# Patient Record
Sex: FEMALE | Race: BLACK OR AFRICAN AMERICAN | NOT HISPANIC OR LATINO | URBAN - METROPOLITAN AREA
[De-identification: names, ages, dates, MRNs, and addresses within clinical notes are randomized per-mention and may not be internally consistent; named-entity substitution may affect disease eponyms.]

---

## 2023-07-17 VITALS
SYSTOLIC BLOOD PRESSURE: 155 MMHG | RESPIRATION RATE: 16 BRPM | HEART RATE: 81 BPM | TEMPERATURE: 98 F | WEIGHT: 164.91 LBS | DIASTOLIC BLOOD PRESSURE: 75 MMHG | HEIGHT: 63 IN | OXYGEN SATURATION: 99 %

## 2023-07-17 RX ORDER — CHLORHEXIDINE GLUCONATE 213 G/1000ML
1 SOLUTION TOPICAL ONCE
Refills: 0 | Status: DISCONTINUED | OUTPATIENT
Start: 2023-07-20 | End: 2023-08-03

## 2023-07-17 NOTE — H&P ADULT - ASSESSMENT
63yo Female w/ PMHx of HTN, HLD, DM T2 who presented to outpatient cardiologist, Dr. Baltazar, endorsing mid sternal chest pressure that is independent of exertion and radiates to L sided jaw. Chest pressure episodes self resolve within a few minutes. Pt also endorsing LAZARO w/ only able to climb 2 flights of stairs before stopping to rest 3-5minutes. She reports having occasional dizziness with postural changes. Pt denies palpitations, syncope, LE edema, orthopnea, PND, abdominal pain, N/V, fever/chills, diaphoresis.      TTE (5/2/23): LVEF 58%, moderate cLVH, no RWMA, G I DD, mild TR.  NST (6/28/23): no evidence of ischemia at the achievable workload.     In light of patient's risk factors and persistent CCS Class IV anginal symptoms despite normal NST, patient now presents to [facility] for cardiac catheterization with possible intervention if clinically indicated.     - ASA III Malampati II  - VSS  - Patient is a candidate for moderate sedation  - Labs reviewed by PA   - EKG - NSR, TWi in III, prolonged   - LOAD -  mg (missed yesterday and today), Plavix 600 mg   - FLUIDS - NS 250ml bolus given x1; NS 75ml/hr given x 2hr     Risks & benefits of procedure and alternative therapy have been explained to the patient including but not limited to: allergic reaction, bleeding w/possible need for blood transfusion, infection, renal and vascular compromise, limb damage, arrhythmia, stroke, vessel dissection/perforation, Myocardial infarction, emergent CABG. Informed consent obtained and in chart.

## 2023-07-17 NOTE — H&P ADULT - NSHPLABSRESULTS_GEN_ALL_CORE
LABS:                          12.8   5.77  )-----------( 245      ( 20 Jul 2023 11:24 )             38.7     07-20    139  |  102  |  10  ----------------------------<  107<H>  4.2   |  26  |  0.82    Ca    9.8      20 Jul 2023 11:24  Mg     2.0     07-20    TPro  7.8  /  Alb  4.2  /  TBili  0.3  /  DBili  x   /  AST  17  /  ALT  16  /  AlkPhos  94  07-20    LIVER FUNCTIONS - ( 20 Jul 2023 11:24 )  Alb: 4.2 g/dL / Pro: 7.8 g/dL / ALK PHOS: 94 U/L / ALT: 16 U/L / AST: 17 U/L / GGT: x           PT/INR - ( 20 Jul 2023 11:24 )   PT: 12.4 sec;   INR: 1.04          PTT - ( 20 Jul 2023 11:24 )  PTT:29.9 sec  Urinalysis Basic - ( 20 Jul 2023 11:24 )    Color: x / Appearance: x / SG: x / pH: x  Gluc: 107 mg/dL / Ketone: x  / Bili: x / Urobili: x   Blood: x / Protein: x / Nitrite: x   Leuk Esterase: x / RBC: x / WBC x   Sq Epi: x / Non Sq Epi: x / Bacteria: x

## 2023-07-17 NOTE — H&P ADULT - HISTORY OF PRESENT ILLNESS
CARDIOLOGIST: Dr. Baltazar  PHARMACY:     Pt is 61yo Female w/ PMHx of HTN, HLD, DM T2 who presented to outpatient cardiologist, Dr. Baltazar, endorsing mid sternal chest pressure that is independent of exertion and radiates to L sided jaw. Chest pressure episodes self resolve within a few minutes. Pt also endorsing LAZARO w/ only able to climb 2 flights of stairs before stopping to rest 3-5minutes. She reports having occasional dizziness with postural changes. Pt denies palpitations, syncope, LE edema, orthopnea, PND, abdominal pain, N/V, fever/chills, diaphoresis.      TTE (5/2/23): LVEF 58%, moderate cLVH, no RWMA, G I DD, mild TR.  NST (6/28/23): no evidence of ischemia at the achievable workload.     In light of patient's risk factors and persistent CCS Class IV anginal symptoms despite normal NST, patient now presents to [facility] for cardiac catheterization with possible intervention if clinically indicated.    CARDIOLOGIST: Dr. Baltazar  PHARMACY: [address]   ESCORT: Viky (sister)      Pt is 61yo Female w/ PMHx of HTN, HLD, DM T2 who presented to outpatient cardiologist, Dr. Baltazar, endorsing mid sternal chest pressure that is independent of exertion and radiates to L sided jaw. Chest pressure episodes self resolve within a few minutes. Pt also endorsing LAZARO w/ only able to climb 2 flights of stairs before stopping to rest 3-5minutes. She reports having occasional dizziness with postural changes. Pt denies palpitations, syncope, LE edema, orthopnea, PND, abdominal pain, N/V, fever/chills, diaphoresis.      TTE (5/2/23): LVEF 58%, moderate cLVH, no RWMA, G I DD, mild TR.  NST (6/28/23): no evidence of ischemia at the achievable workload.     In light of patient's risk factors and persistent CCS Class IV anginal symptoms despite normal NST, patient now presents to [facility] for cardiac catheterization with possible intervention if clinically indicated.

## 2023-07-20 ENCOUNTER — OUTPATIENT (OUTPATIENT)
Dept: OUTPATIENT SERVICES | Facility: HOSPITAL | Age: 63
LOS: 1 days | Discharge: HOME | End: 2023-07-20
Payer: COMMERCIAL

## 2023-07-20 LAB
A1C WITH ESTIMATED AVERAGE GLUCOSE RESULT: 6.6 % — HIGH (ref 4–5.6)
ALBUMIN SERPL ELPH-MCNC: 4.2 G/DL — SIGNIFICANT CHANGE UP (ref 3.3–5)
ALP SERPL-CCNC: 94 U/L — SIGNIFICANT CHANGE UP (ref 40–120)
ALT FLD-CCNC: 16 U/L — SIGNIFICANT CHANGE UP (ref 10–45)
ANION GAP SERPL CALC-SCNC: 11 MMOL/L — SIGNIFICANT CHANGE UP (ref 5–17)
APTT BLD: 29.9 SEC — SIGNIFICANT CHANGE UP (ref 27.5–35.5)
AST SERPL-CCNC: 17 U/L — SIGNIFICANT CHANGE UP (ref 10–40)
BASOPHILS # BLD AUTO: 0.03 K/UL — SIGNIFICANT CHANGE UP (ref 0–0.2)
BASOPHILS NFR BLD AUTO: 0.5 % — SIGNIFICANT CHANGE UP (ref 0–2)
BILIRUB SERPL-MCNC: 0.3 MG/DL — SIGNIFICANT CHANGE UP (ref 0.2–1.2)
BUN SERPL-MCNC: 10 MG/DL — SIGNIFICANT CHANGE UP (ref 7–23)
CALCIUM SERPL-MCNC: 9.8 MG/DL — SIGNIFICANT CHANGE UP (ref 8.4–10.5)
CHLORIDE SERPL-SCNC: 102 MMOL/L — SIGNIFICANT CHANGE UP (ref 96–108)
CHOLEST SERPL-MCNC: 157 MG/DL — SIGNIFICANT CHANGE UP
CK MB CFR SERPL CALC: 1.6 NG/ML — SIGNIFICANT CHANGE UP (ref 0–6.7)
CK SERPL-CCNC: 148 U/L — SIGNIFICANT CHANGE UP (ref 25–170)
CO2 SERPL-SCNC: 26 MMOL/L — SIGNIFICANT CHANGE UP (ref 22–31)
CREAT SERPL-MCNC: 0.82 MG/DL — SIGNIFICANT CHANGE UP (ref 0.5–1.3)
EGFR: 81 ML/MIN/1.73M2 — SIGNIFICANT CHANGE UP
EOSINOPHIL # BLD AUTO: 0.09 K/UL — SIGNIFICANT CHANGE UP (ref 0–0.5)
EOSINOPHIL NFR BLD AUTO: 1.6 % — SIGNIFICANT CHANGE UP (ref 0–6)
ESTIMATED AVERAGE GLUCOSE: 143 MG/DL — HIGH (ref 68–114)
GLUCOSE BLDC GLUCOMTR-MCNC: 81 MG/DL — SIGNIFICANT CHANGE UP (ref 70–99)
GLUCOSE SERPL-MCNC: 107 MG/DL — HIGH (ref 70–99)
HCT VFR BLD CALC: 38.7 % — SIGNIFICANT CHANGE UP (ref 34.5–45)
HDLC SERPL-MCNC: 58 MG/DL — SIGNIFICANT CHANGE UP
HGB BLD-MCNC: 12.8 G/DL — SIGNIFICANT CHANGE UP (ref 11.5–15.5)
IMM GRANULOCYTES NFR BLD AUTO: 0.2 % — SIGNIFICANT CHANGE UP (ref 0–0.9)
INR BLD: 1.04 — SIGNIFICANT CHANGE UP (ref 0.88–1.16)
LIPID PNL WITH DIRECT LDL SERPL: 84 MG/DL — SIGNIFICANT CHANGE UP
LYMPHOCYTES # BLD AUTO: 2.71 K/UL — SIGNIFICANT CHANGE UP (ref 1–3.3)
LYMPHOCYTES # BLD AUTO: 47 % — HIGH (ref 13–44)
MAGNESIUM SERPL-MCNC: 2 MG/DL — SIGNIFICANT CHANGE UP (ref 1.6–2.6)
MCHC RBC-ENTMCNC: 27.6 PG — SIGNIFICANT CHANGE UP (ref 27–34)
MCHC RBC-ENTMCNC: 33.1 GM/DL — SIGNIFICANT CHANGE UP (ref 32–36)
MCV RBC AUTO: 83.6 FL — SIGNIFICANT CHANGE UP (ref 80–100)
MONOCYTES # BLD AUTO: 0.35 K/UL — SIGNIFICANT CHANGE UP (ref 0–0.9)
MONOCYTES NFR BLD AUTO: 6.1 % — SIGNIFICANT CHANGE UP (ref 2–14)
NEUTROPHILS # BLD AUTO: 2.58 K/UL — SIGNIFICANT CHANGE UP (ref 1.8–7.4)
NEUTROPHILS NFR BLD AUTO: 44.6 % — SIGNIFICANT CHANGE UP (ref 43–77)
NON HDL CHOLESTEROL: 99 MG/DL — SIGNIFICANT CHANGE UP
NRBC # BLD: 0 /100 WBCS — SIGNIFICANT CHANGE UP (ref 0–0)
PLATELET # BLD AUTO: 245 K/UL — SIGNIFICANT CHANGE UP (ref 150–400)
POTASSIUM SERPL-MCNC: 4.2 MMOL/L — SIGNIFICANT CHANGE UP (ref 3.5–5.3)
POTASSIUM SERPL-SCNC: 4.2 MMOL/L — SIGNIFICANT CHANGE UP (ref 3.5–5.3)
PROT SERPL-MCNC: 7.8 G/DL — SIGNIFICANT CHANGE UP (ref 6–8.3)
PROTHROM AB SERPL-ACNC: 12.4 SEC — SIGNIFICANT CHANGE UP (ref 10.5–13.4)
RBC # BLD: 4.63 M/UL — SIGNIFICANT CHANGE UP (ref 3.8–5.2)
RBC # FLD: 13 % — SIGNIFICANT CHANGE UP (ref 10.3–14.5)
SODIUM SERPL-SCNC: 139 MMOL/L — SIGNIFICANT CHANGE UP (ref 135–145)
TRIGL SERPL-MCNC: 73 MG/DL — SIGNIFICANT CHANGE UP
WBC # BLD: 5.77 K/UL — SIGNIFICANT CHANGE UP (ref 3.8–10.5)
WBC # FLD AUTO: 5.77 K/UL — SIGNIFICANT CHANGE UP (ref 3.8–10.5)

## 2023-07-20 PROCEDURE — 85025 COMPLETE CBC W/AUTO DIFF WBC: CPT

## 2023-07-20 PROCEDURE — 99152 MOD SED SAME PHYS/QHP 5/>YRS: CPT

## 2023-07-20 PROCEDURE — 85610 PROTHROMBIN TIME: CPT

## 2023-07-20 PROCEDURE — C1769: CPT

## 2023-07-20 PROCEDURE — 82553 CREATINE MB FRACTION: CPT

## 2023-07-20 PROCEDURE — 85730 THROMBOPLASTIN TIME PARTIAL: CPT

## 2023-07-20 PROCEDURE — 82550 ASSAY OF CK (CPK): CPT

## 2023-07-20 PROCEDURE — 93458 L HRT ARTERY/VENTRICLE ANGIO: CPT

## 2023-07-20 PROCEDURE — 80061 LIPID PANEL: CPT

## 2023-07-20 PROCEDURE — 83735 ASSAY OF MAGNESIUM: CPT

## 2023-07-20 PROCEDURE — 82962 GLUCOSE BLOOD TEST: CPT

## 2023-07-20 PROCEDURE — 83036 HEMOGLOBIN GLYCOSYLATED A1C: CPT

## 2023-07-20 PROCEDURE — C1887: CPT

## 2023-07-20 PROCEDURE — 80053 COMPREHEN METABOLIC PANEL: CPT

## 2023-07-20 PROCEDURE — C1894: CPT

## 2023-07-20 RX ORDER — SODIUM CHLORIDE 9 MG/ML
500 INJECTION INTRAMUSCULAR; INTRAVENOUS; SUBCUTANEOUS
Refills: 0 | Status: DISCONTINUED | OUTPATIENT
Start: 2023-07-20 | End: 2023-07-20

## 2023-07-20 RX ORDER — METOPROLOL TARTRATE 50 MG
1 TABLET ORAL
Refills: 0 | DISCHARGE

## 2023-07-20 RX ORDER — CLOPIDOGREL BISULFATE 75 MG/1
600 TABLET, FILM COATED ORAL ONCE
Refills: 0 | Status: COMPLETED | OUTPATIENT
Start: 2023-07-20 | End: 2023-07-20

## 2023-07-20 RX ORDER — ASPIRIN/CALCIUM CARB/MAGNESIUM 324 MG
162 TABLET ORAL ONCE
Refills: 0 | Status: COMPLETED | OUTPATIENT
Start: 2023-07-20 | End: 2023-07-20

## 2023-07-20 RX ORDER — SEMAGLUTIDE 0.68 MG/ML
1 INJECTION, SOLUTION SUBCUTANEOUS
Refills: 0 | DISCHARGE

## 2023-07-20 RX ORDER — ATORVASTATIN CALCIUM 80 MG/1
1 TABLET, FILM COATED ORAL
Refills: 0 | DISCHARGE

## 2023-07-20 RX ORDER — SODIUM CHLORIDE 9 MG/ML
500 INJECTION INTRAMUSCULAR; INTRAVENOUS; SUBCUTANEOUS
Refills: 0 | Status: DISCONTINUED | OUTPATIENT
Start: 2023-07-20 | End: 2023-08-03

## 2023-07-20 RX ORDER — SODIUM CHLORIDE 9 MG/ML
250 INJECTION INTRAMUSCULAR; INTRAVENOUS; SUBCUTANEOUS ONCE
Refills: 0 | Status: COMPLETED | OUTPATIENT
Start: 2023-07-20 | End: 2023-07-20

## 2023-07-20 RX ORDER — LISINOPRIL 2.5 MG/1
1 TABLET ORAL
Refills: 0 | DISCHARGE

## 2023-07-20 RX ORDER — METFORMIN HYDROCHLORIDE 850 MG/1
1 TABLET ORAL
Refills: 0 | DISCHARGE

## 2023-07-20 RX ORDER — ASPIRIN/CALCIUM CARB/MAGNESIUM 324 MG
1 TABLET ORAL
Refills: 0 | DISCHARGE

## 2023-07-20 RX ORDER — AMLODIPINE BESYLATE 2.5 MG/1
1 TABLET ORAL
Refills: 0 | DISCHARGE

## 2023-07-20 RX ADMIN — CLOPIDOGREL BISULFATE 600 MILLIGRAM(S): 75 TABLET, FILM COATED ORAL at 12:22

## 2023-07-20 RX ADMIN — SODIUM CHLORIDE 1000 MILLILITER(S): 9 INJECTION INTRAMUSCULAR; INTRAVENOUS; SUBCUTANEOUS at 12:15

## 2023-07-20 RX ADMIN — Medication 162 MILLIGRAM(S): at 12:22

## 2023-07-20 RX ADMIN — SODIUM CHLORIDE 225 MILLILITER(S): 9 INJECTION INTRAMUSCULAR; INTRAVENOUS; SUBCUTANEOUS at 18:43

## 2023-07-20 RX ADMIN — SODIUM CHLORIDE 75 MILLILITER(S): 9 INJECTION INTRAMUSCULAR; INTRAVENOUS; SUBCUTANEOUS at 12:15

## 2023-07-20 NOTE — PROGRESS NOTE ADULT - SUBJECTIVE AND OBJECTIVE BOX
Interventional Cardiology PA SDA Discharge Note    Patient without complaints. Ambulated and voided without difficulties    Afebrile, VSS    Ext: Right Radial: No hematoma, bleeding, dressing; C/D/I      Pulses: intact RAD/DP/PT to baseline     A/P:    61yo Female w/ PMHx of HTN, HLD, DM T2 who presented to outpatient cardiologist, Dr. Baltazar, endorsing mid sternal chest pressure that is independent of exertion and radiates to L sided jaw. Chest pressure episodes self resolve within a few minutes. Pt also endorsing LAZARO w/ only able to climb 2 flights of stairs before stopping to rest 3-5minutes. She reports having occasional dizziness with postural changes. Pt denies palpitations, syncope, LE edema, orthopnea, PND, abdominal pain, N/V, fever/chills, diaphoresis. In light of patient's risk factors and persistent CCS Class IV anginal symptoms despite normal NST, patient now presents to [facility] for cardiac catheterization with possible intervention if clinically indicated.     Patient now s/p cardiac cath 7/20/23: Normal coronaries, LM/LAD/LCx/RCA free of disease. EDP 10    1. Stable for discharge as per attending Dr. Baltazar after bed rest, pt voids, groin/wrist stable and 30 minutes of ambulation.  2. Follow-up with Cardiologist Delaney in 1-2 weeks  3. Discharged forms signed and copies in chart     Interventional Cardiology PA SDA Discharge Note    Patient without complaints. Ambulated and voided without difficulties    Afebrile, VSS    Ext: Right Radial: No hematoma, bleeding, dressing; C/D/I      Pulses: intact RAD/DP/PT to baseline     A/P:    63yo Female w/ PMHx of HTN, HLD, DM T2 who presented to outpatient cardiologist, Dr. Baltazar, endorsing mid sternal chest pressure that is independent of exertion and radiates to L sided jaw. Chest pressure episodes self resolve within a few minutes. Pt also endorsing LAZARO w/ only able to climb 2 flights of stairs before stopping to rest 3-5minutes. She reports having occasional dizziness with postural changes. Pt denies palpitations, syncope, LE edema, orthopnea, PND, abdominal pain, N/V, fever/chills, diaphoresis. In light of patient's risk factors and persistent CCS Class IV anginal symptoms despite normal NST, patient now presents to [facility] for cardiac catheterization with possible intervention if clinically indicated.     Patient now s/p cardiac cath 7/20/23: Normal coronaries, LM/LAD/LCx/RCA free of disease. EDP 10    1. Stable for discharge as per attending Dr. Baltazar after bed rest, pt voids, right wrist stable and 30 minutes of ambulation.  2. Follow-up with Cardiologashley Baltazar in 1-2 weeks  3. Discharged forms signed and copies in chart

## 2023-07-27 DIAGNOSIS — R06.00 DYSPNEA, UNSPECIFIED: ICD-10-CM

## 2023-07-27 DIAGNOSIS — Z82.49 FAMILY HISTORY OF ISCHEMIC HEART DISEASE AND OTHER DISEASES OF THE CIRCULATORY SYSTEM: ICD-10-CM

## 2023-07-27 DIAGNOSIS — I20.0 UNSTABLE ANGINA: ICD-10-CM

## 2024-03-21 NOTE — H&P ADULT - RESPIRATORY RATE (BREATHS/MIN)
X-ray of the foot was negative.  Review x-ray ankle negative.  Ace wrap applied.  Postop shoe provided.  At this time I would like you to contact orthopedic podiatry specialist Dr. Sarkis Gonzalez tomorrow for an appointment on Friday if possible.  I would like ibuprofen 6 and milligram 2 times a day plus Tylenol 1000 mg 3 times a day.  
16